# Patient Record
Sex: FEMALE | Race: WHITE | NOT HISPANIC OR LATINO | Employment: OTHER | URBAN - METROPOLITAN AREA
[De-identification: names, ages, dates, MRNs, and addresses within clinical notes are randomized per-mention and may not be internally consistent; named-entity substitution may affect disease eponyms.]

---

## 2024-01-04 ENCOUNTER — ANCILLARY PROCEDURE (OUTPATIENT)
Dept: RADIOLOGY | Facility: CLINIC | Age: 74
End: 2024-01-04
Payer: COMMERCIAL

## 2024-01-04 ENCOUNTER — OFFICE VISIT (OUTPATIENT)
Dept: ORTHOPEDIC SURGERY | Facility: CLINIC | Age: 74
End: 2024-01-04
Payer: COMMERCIAL

## 2024-01-04 DIAGNOSIS — Z96.652 TOTAL KNEE REPLACEMENT STATUS, LEFT: ICD-10-CM

## 2024-01-04 PROCEDURE — 99214 OFFICE O/P EST MOD 30 MIN: CPT | Mod: 25 | Performed by: ORTHOPAEDIC SURGERY

## 2024-01-04 PROCEDURE — 99214 OFFICE O/P EST MOD 30 MIN: CPT | Performed by: ORTHOPAEDIC SURGERY

## 2024-01-04 PROCEDURE — 73560 X-RAY EXAM OF KNEE 1 OR 2: CPT | Mod: LEFT SIDE | Performed by: RADIOLOGY

## 2024-01-04 PROCEDURE — G0463 HOSPITAL OUTPT CLINIC VISIT: HCPCS | Mod: 25 | Performed by: ORTHOPAEDIC SURGERY

## 2024-01-04 PROCEDURE — 73560 X-RAY EXAM OF KNEE 1 OR 2: CPT | Mod: LT

## 2024-01-04 NOTE — PROGRESS NOTES
Chief complaint is [facility] visit for left total knee replacement done in 2013, having some instability symptoms in the left knee.    History this is a 73-year-old female with a history of cardiac heart disease who has had bilateral knee replacements.  The right one was done in 2004.  This is not related to the current Worker's Comp. condition which we are seeing her for today.  Her left knee was done in 2013.  She has noticed that she has had pain in the left knee and left thigh and that she is getting more and more instability or inability to walk without a cane on the left side.  She has difficulty putting her shoes and socks on that side.    Her clinical exam reveals a 73-year-old female who appears physically her stated age.  She is 4 foot 9 inches tall.  She has a significantly antalgic gait she has difficulty getting up onto the exam table today.  She has excellent range of motion of her left knee with full extension to 130 degrees of flexion.  She has a slightly positive shift anterior posteriorly and medial laterally maybe 1-2+ but not grossly unstable.  Patella tracks well.    I did examine her left hip as part of the left knee exam and her left hip is somewhat stiff.  She has pain with active flexion of the left hip she is unable to flex more than 90 degrees she abducts about 20 degrees she has minimal internal rotation but about 30 degrees of external rotation.  All these moves appear to bother her left hip.    X-rays were done of the left knee which show good alignment of the left knee there is no obvious wear of components.  There is no shift on the AP or lateral.    Assessment left total knee replacement 11 years out doing reasonably well having some instability symptoms probably secondary to deconditioning but could also be secondary to left hip osteoarthritis.  Because this is not related to her original Worker's Comp. injury she prefer we do this on a different day to investigate.    Plan I plan on  seeing her back in 4 months when the weather is better.  And I want an x-ray AP pelvis and the left hip.  This will be on her regular insurance for evaluation of left hip arthritis.

## 2024-05-09 ENCOUNTER — APPOINTMENT (OUTPATIENT)
Dept: ORTHOPEDIC SURGERY | Facility: CLINIC | Age: 74
End: 2024-05-09
Payer: COMMERCIAL

## 2024-12-05 ENCOUNTER — HOSPITAL ENCOUNTER (OUTPATIENT)
Dept: RADIOLOGY | Facility: CLINIC | Age: 74
Discharge: HOME | End: 2024-12-05
Payer: COMMERCIAL

## 2024-12-05 ENCOUNTER — HOSPITAL ENCOUNTER (OUTPATIENT)
Dept: RADIOLOGY | Facility: CLINIC | Age: 74
End: 2024-12-05
Payer: COMMERCIAL

## 2024-12-05 ENCOUNTER — OFFICE VISIT (OUTPATIENT)
Dept: ORTHOPEDIC SURGERY | Facility: CLINIC | Age: 74
End: 2024-12-05
Payer: COMMERCIAL

## 2024-12-05 ENCOUNTER — APPOINTMENT (OUTPATIENT)
Dept: RADIOLOGY | Facility: CLINIC | Age: 74
End: 2024-12-05
Payer: COMMERCIAL

## 2024-12-05 DIAGNOSIS — M25.561 CHRONIC PAIN OF RIGHT KNEE: ICD-10-CM

## 2024-12-05 DIAGNOSIS — G89.29 HIP PAIN, CHRONIC, LEFT: ICD-10-CM

## 2024-12-05 DIAGNOSIS — Z96.652 STATUS POST LEFT KNEE REPLACEMENT: ICD-10-CM

## 2024-12-05 DIAGNOSIS — Z96.652 TOTAL KNEE REPLACEMENT STATUS, LEFT: ICD-10-CM

## 2024-12-05 DIAGNOSIS — G89.29 CHRONIC PAIN OF RIGHT KNEE: ICD-10-CM

## 2024-12-05 DIAGNOSIS — M25.552 HIP PAIN, CHRONIC, LEFT: ICD-10-CM

## 2024-12-05 PROCEDURE — 99214 OFFICE O/P EST MOD 30 MIN: CPT | Performed by: ORTHOPAEDIC SURGERY

## 2024-12-05 PROCEDURE — 73560 X-RAY EXAM OF KNEE 1 OR 2: CPT | Mod: RT

## 2024-12-05 ASSESSMENT — PAIN SCALES - GENERAL: PAINLEVEL_OUTOF10: 5 - MODERATE PAIN

## 2024-12-05 ASSESSMENT — PAIN - FUNCTIONAL ASSESSMENT: PAIN_FUNCTIONAL_ASSESSMENT: 0-10

## 2024-12-05 ASSESSMENT — PAIN DESCRIPTION - DESCRIPTORS: DESCRIPTORS: TENDER;SORE

## 2024-12-05 NOTE — PROGRESS NOTES
Chief complaint is right knee pain some instability.    History 74-year-old female with a history of heart problems valve problems who had a right total knee replacement done in 2004 and is now 19 years old.  This was originally a Worker's Comp. injury the injury is from 6/14/1998.  The injury originally occurred in her parking lot and she twisted her knee.  There has been some confusion about left and right knees.  She has a left total knee replacement which I am not evaluating today but is another Worker's Comp. case.  The left total knee replacement was done in 2013 just for back story.  The right knee is the one giving her more problems with instability.  She says when she comes down steps her knee feels moderately unstable.    Her examination reveals a 74-year-old female.  She is oriented x 3 but somewhat anxious today in the office.  Her respirations are unlabored and regular.    Her right knee has about a 1+ joint effusion.  Her knee comes out to full extension flexes to almost 145 degrees.  She has about 2+ instability medial laterally.  She has also about 2+ anterior posterior instability but she does not hyperextend.  Her patella tracks well.    We did x-rays of her right knee replacement.  She has an old Duraclon total knee replacement from [place] which shows the tibial and femoral component in good position however, she may be getting a little wear from the poly component.  There is slight posterior subluxation of the femur on the tibia on the lateral.  It is very slight.    Assessment patient is showing some polyethylene wear on her right total knee replacement although there is not complete instability and certainly not complete loosening.    Plan I would like to see her back again in about 9 months for her right knee.  And I would like to get a weightbearing x-ray of the right knee at that time.  She may require revision of her right knee replacement in the future but currently she is being worked up  for possible cardiac valve surgery.  I think this would take precedence in any situation.  Please note that she is having trouble with her left hip but she is going to have this worked up at [facility] where she can use her regular insurance.  She did refuse hip x-rays today

## 2025-09-11 ENCOUNTER — APPOINTMENT (OUTPATIENT)
Dept: ORTHOPEDIC SURGERY | Facility: CLINIC | Age: 75
End: 2025-09-11
Payer: COMMERCIAL